# Patient Record
Sex: FEMALE | Race: WHITE
[De-identification: names, ages, dates, MRNs, and addresses within clinical notes are randomized per-mention and may not be internally consistent; named-entity substitution may affect disease eponyms.]

---

## 2018-07-10 ENCOUNTER — HOSPITAL ENCOUNTER (EMERGENCY)
Dept: HOSPITAL 7 - FB.ED | Age: 78
Discharge: HOME | End: 2018-07-10
Payer: MEDICARE

## 2018-07-10 DIAGNOSIS — G20: ICD-10-CM

## 2018-07-10 DIAGNOSIS — K29.50: Primary | ICD-10-CM

## 2018-07-10 DIAGNOSIS — F32.9: ICD-10-CM

## 2018-07-10 DIAGNOSIS — Z79.899: ICD-10-CM

## 2018-07-10 PROCEDURE — 87086 URINE CULTURE/COLONY COUNT: CPT

## 2018-07-10 PROCEDURE — 87186 SC STD MICRODIL/AGAR DIL: CPT

## 2018-07-10 PROCEDURE — 96374 THER/PROPH/DIAG INJ IV PUSH: CPT

## 2018-07-10 PROCEDURE — C9113 INJ PANTOPRAZOLE SODIUM, VIA: HCPCS

## 2018-07-10 PROCEDURE — 85025 COMPLETE CBC W/AUTO DIFF WBC: CPT

## 2018-07-10 PROCEDURE — 80053 COMPREHEN METABOLIC PANEL: CPT

## 2018-07-10 PROCEDURE — 96375 TX/PRO/DX INJ NEW DRUG ADDON: CPT

## 2018-07-10 PROCEDURE — 82150 ASSAY OF AMYLASE: CPT

## 2018-07-10 PROCEDURE — 36415 COLL VENOUS BLD VENIPUNCTURE: CPT

## 2018-07-10 PROCEDURE — 96361 HYDRATE IV INFUSION ADD-ON: CPT

## 2018-07-10 PROCEDURE — 99284 EMERGENCY DEPT VISIT MOD MDM: CPT

## 2018-07-10 PROCEDURE — 81001 URINALYSIS AUTO W/SCOPE: CPT

## 2018-07-10 NOTE — EDM.PDOC
ED HPI GENERAL MEDICAL PROBLEM





- General


Chief Complaint: Abdominal Pain


Stated Complaint: ABDOMINAL PAIN AND NASEOUS


Time Seen by Provider: 07/10/18 19:20


Source of Information: Reports: Patient, Family, Old Records


History Limitations: Reports: No Limitations





- History of Present Illness


INITIAL COMMENTS - FREE TEXT/NARRATIVE: 





Karon comes into [facility] ED with a 7 mos hx of intermittent nausea. There has 

been an associated wt loss of several pounds, and restricted dietary intake. 

There has been no vomiting, constipation or rubi diarrhea. Stools are not pale 

or discolored. Today she reports some retrosternal and epigastric burning pain 

in the abdomen. There is no radiation of pain into the back or pelvis. She 

denies ETOH, NSAID or ASA use. She takes Tylenol for arthritis, and meds for 

Parkinsons. She lives in assisted living, wheelchair bound. She had made an 

appt to see a PCP later this month, but current sxs necessitated an ED visit. 

She was treated for a UTI last month. 


Treatments PTA: Reports: Other (see below)


Other Treatments PTA: Zofran


  ** Epigastric


Pain Score (Numeric/FACES): 4





- Related Data


 Allergies











Allergy/AdvReac Type Severity Reaction Status Date / Time


 


No Known Allergies Allergy   Verified 07/14/15 14:47











Home Meds: 


 Home Meds





ALPRAZolam [Alprazolam] 0.5 mg PO Q8H PRN 07/14/15 [History]


Flaxseed Oil  1 tab PO DAILY 07/14/15 [History]


Multivitamin [Tab A Dale] 1 tab PO DAILY 07/14/15 [History]


Omega-3 Fatty Acids [Fish Oil] 300 mg PO BID 07/14/15 [History]


PARoxetine [Paxil] 20 mg PO DAILY 07/14/15 [History]


Simvastatin [Zocor] 20 mg PO WITHDINNER 07/14/15 [History]


Thyroid,Pork [NP Thyroid] 30 mg PO DAILY 07/14/15 [History]


Acetaminophen/HYDROcodone [Norco 325-7.5 MG] 1 tab PO Q8HR PRN #60 tablet 07/29/

15 [Rx]


Enoxaparin [Lovenox] 40 mg SUBCUT DAILY #7 syringe 07/29/15 [Rx]


Famotidine [Pepcid] 20 mg PO BID #30 tablet 07/29/15 [Rx]


Ferrous Sulfate 325 mg PO DAILY #0 07/29/15 [Rx]


Ondansetron [Zofran ODT] 4 mg PO Q4H PRN #12 tab.dis 07/29/15 [Rx]


Polyethylene Glycol 3350 [MiraLAX] 17 gm PO DAILY #30 packet 07/29/15 [Rx]


rOPINIRole [Requip] 1 mg PO QID #120 tablet 07/29/15 [Rx]


Pantoprazole Sodium [Protonix] 40 mg PO DAILY #14 tablet. 07/10/18 [Rx]











Past Medical History


Other Musculoskeletal History: right hip replacement 12 years ago


Neurological History: Reports: Parkinson's


Other Neuro History: essential tremors


Other Psychiatric History: depression when   





Social & Family History





- Tobacco Use


Smoking Status *Q: Former Smoker


Years of Tobacco use: 5


Used Tobacco, but Quit: Yes


Month/Year Tobacco Last Used: 





- Caffeine Use


Caffeine Use: Reports: Soda





- Recreational Drug Use


Recreational Drug Use: No





ED ROS GENERAL





- Review of Systems


Review Of Systems: See Below


Constitutional: Reports: Malaise, Decreased Appetite, Weight Loss


HEENT: Reports: No Symptoms


Respiratory: Reports: No Symptoms


Cardiovascular: Reports: No Symptoms


Endocrine: Reports: No Symptoms


GI/Abdominal: Reports: Abdominal Pain, Anorexia, Decreased Appetite, Nausea


: Reports: Incontinence


Musculoskeletal: Reports: Joint Pain (L hip chronic)


Skin: Reports: No Symptoms


Neurological: Reports: Pre-Existing Deficit, Tremors


Psychiatric: Reports: Depression


Hematologic/Lymphatic: Reports: No Symptoms


Immunologic: Reports: No Symptoms





ED EXAM, GI/ABD





- Physical Exam


Exam: See Below


Exam Limited By: No Limitations


General Appearance: Alert, WD/WN, No Apparent Distress, Anxious


Eyes: Bilateral: Normal Appearance, EOMI


Ears: Normal External Exam


Nose: Normal Inspection


Throat/Mouth: Normal Inspection, Normal Lips, Normal Teeth, Normal Gums, Normal 

Oropharynx, Normal Voice


Head: Normocephalic


Neck: Normal Inspection, Supple, Non-Tender


Respiratory/Chest: Lungs Clear, Normal Breath Sounds, Chest Non-Tender


Cardiovascular: Normal Peripheral Pulses, Regular Rate, Rhythm, No Edema, No 

Murmur, No Rub


GI/Abdominal Exam: Normal Bowel Sounds, Soft, No Organomegaly, No Distention, 

No Mass, Tender (mild epigastric )


 (Female) Exam: Deferred


Rectal (Female) Exam: Deferred


Back Exam: Normal Inspection


Extremities: Limited Range of Motion (L hip, #3,4,5 digits both hands (

contractures))


Neurological: Alert, Oriented, CN II-XII Intact, Normal Cognition, No Motor/

Sensory Deficits


Psychiatric: Normal Affect (mood neutral)


Skin Exam: Warm, Dry, Intact, Normal Color, No Rash


Lymphatic: No Adenopathy





Course





- Vital Signs


Text/Narrative:: 





Following assessment at the [facility] ED, I started an IV in the LUE, and 

administered 1L of NS and 1L of D5LR over the next 2 hours, Protonix 40 mg IV 

infusion, and Zofran 4 mg IV. Karon had complete resolution of sxs. Screening 

CBC and CMP labs were baseline, UA abnormal, UC set up. 


Last Recorded V/S: 


 Last Vital Signs











Temp  36.4 C   07/10/18 19:00


 


Pulse  80   07/10/18 19:00


 


Resp  18   07/10/18 19:00


 


BP  122/99 H  07/10/18 19:00


 


Pulse Ox  97   07/10/18 19:00














- Orders/Labs/Meds


Orders: 


 Active Orders 24 hr











 Category Date Time Status


 


 CULTURE URINE [RM] Stat Lab  07/10/18 22:26 Ordered


 


 UA W/MICROSCOPIC [URIN] Stat Lab  07/10/18 22:05 Ordered


 


 Dextrose 5%-Lactated Ringers 1,000 ml Med  07/10/18 21:15 Active





 IV ASDIRECTED   


 


 Sodium Chloride 0.9% [Normal Saline] 1,000 ml Med  07/10/18 20:00 Active





 IV ASDIRECTED   


 


 Sodium Chloride 0.9% [Saline Flush] Med  07/10/18 19:37 Active





 10 ml FLUSH ASDIRECTED PRN   


 


 Peripheral IV Insertion Adult [OM.PC] Routine Oth  07/10/18 19:37 Ordered








 Medication Orders





Sodium Chloride (Normal Saline)  1,000 mls @ 999 mls/hr IV ASDIRECTED KATHY


   Last Admin: 07/10/18 20:00  Dose: 999 mls/hr


Dextrose/Lactated Ringer's (Dextrose 5%-Lactated Ringers)  1,000 mls @ 999 mls/

hr IV ASDIRECTED KATHY


Sodium Chloride (Saline Flush)  10 ml FLUSH ASDIRECTED PRN


   PRN Reason: Keep Vein Open








Labs: 


 Laboratory Tests











  07/10/18 07/10/18 07/10/18 Range/Units





  19:45 19:45 19:45 


 


WBC  5.3    (4.5-12.0)  X10-3/uL


 


RBC  4.58    (3.23-5.20)  x10(6)uL


 


Hgb  13.9    (11.5-15.5)  g/dL


 


Hct  41.5  D    (30.0-51.3)  %


 


MCV  90.7    (80-96)  fL


 


MCH  30.4    (27.7-33.6)  pg


 


MCHC  33.6    (32.2-35.4)  g/dL


 


RDW  14.1    (11.5-15.5)  %


 


Plt Count  237    (125-369)  X10(3)uL


 


MPV  9.6    (7.4-10.4)  fL


 


Neut % (Auto)  56.7    (46-82)  %


 


Lymph % (Auto)  33.0    (13-37)  %


 


Mono % (Auto)  8.7    (4-12)  %


 


Eos % (Auto)  1    (1.0-5.0)  %


 


Baso % (Auto)  1    (0-2)  %


 


Neut # (Auto)  3.0    (1.6-8.3)  #


 


Lymph # (Auto)  1.8    (0.6-5.0)  #


 


Mono # (Auto)  0.5    (0.0-1.3)  #


 


Eos # (Auto)  0.0    (0.0-0.8)  #


 


Baso # (Auto)  0.0    (0.0-0.2)  #


 


Sodium   145   (135-145)  mmol/L


 


Potassium   3.2 L   (3.5-5.3)  mmol/L


 


Chloride   106   (100-110)  mmol/L


 


Carbon Dioxide   27   (21-32)  mmol/L


 


BUN   7   (7-18)  mg/dL


 


Creatinine   0.6   (0.55-1.02)  mg/dL


 


Est Cr Clr Drug Dosing   71.78   mL/min


 


Estimated GFR (MDRD)   > 60   (>60)  


 


BUN/Creatinine Ratio   11.7   (9-20)  


 


Glucose   97   ()  mg/dL


 


Calcium   8.4 L   (8.6-10.2)  mg/dL


 


Total Bilirubin   0.4   (0.1-1.3)  mg/dL


 


AST   13   (5-25)  IU/L


 


ALT   8 L   (12-36)  U/L


 


Alkaline Phosphatase   57   ()  IU/L


 


Total Protein   6.8   (6.0-8.0)  g/dL


 


Albumin   3.4   (3.2-4.6)  g/dL


 


Globulin   3.4   g/dL


 


Albumin/Globulin Ratio   1.0   


 


Amylase    38  ()  U/L


 


Urine Color     (YELLOW)  


 


Urine Appearance     (CLEAR)  


 


Urine pH     (5.0-6.5)  


 


Ur Specific Gravity     (1.010-1.025)  


 


Urine Protein     (NEGATIVE)  mg/dL


 


Urine Glucose (UA)     (NEGATIVE)  mg/dL


 


Urine Ketones     (NEGATIVE)  mg/dL


 


Urine Occult Blood     (NEGATIVE)  


 


Urine Nitrite     (NEGATIVE)  


 


Urine Bilirubin     (NEGATIVE)  


 


Urine Urobilinogen     (NEGATIVE)  mg/dL


 


Ur Leukocyte Esterase     (NEGATIVE)  


 


Urine RBC     (0)  


 


Urine WBC     (0)  


 


Ur Squamous Epith Cells     (NS,R,O)  


 


Urine Bacteria     (NS)  














  07/10/18 Range/Units





  22:05 


 


WBC   (4.5-12.0)  X10-3/uL


 


RBC   (3.23-5.20)  x10(6)uL


 


Hgb   (11.5-15.5)  g/dL


 


Hct   (30.0-51.3)  %


 


MCV   (80-96)  fL


 


MCH   (27.7-33.6)  pg


 


MCHC   (32.2-35.4)  g/dL


 


RDW   (11.5-15.5)  %


 


Plt Count   (125-369)  X10(3)uL


 


MPV   (7.4-10.4)  fL


 


Neut % (Auto)   (46-82)  %


 


Lymph % (Auto)   (13-37)  %


 


Mono % (Auto)   (4-12)  %


 


Eos % (Auto)   (1.0-5.0)  %


 


Baso % (Auto)   (0-2)  %


 


Neut # (Auto)   (1.6-8.3)  #


 


Lymph # (Auto)   (0.6-5.0)  #


 


Mono # (Auto)   (0.0-1.3)  #


 


Eos # (Auto)   (0.0-0.8)  #


 


Baso # (Auto)   (0.0-0.2)  #


 


Sodium   (135-145)  mmol/L


 


Potassium   (3.5-5.3)  mmol/L


 


Chloride   (100-110)  mmol/L


 


Carbon Dioxide   (21-32)  mmol/L


 


BUN   (7-18)  mg/dL


 


Creatinine   (0.55-1.02)  mg/dL


 


Est Cr Clr Drug Dosing   mL/min


 


Estimated GFR (MDRD)   (>60)  


 


BUN/Creatinine Ratio   (9-20)  


 


Glucose   ()  mg/dL


 


Calcium   (8.6-10.2)  mg/dL


 


Total Bilirubin   (0.1-1.3)  mg/dL


 


AST   (5-25)  IU/L


 


ALT   (12-36)  U/L


 


Alkaline Phosphatase   ()  IU/L


 


Total Protein   (6.0-8.0)  g/dL


 


Albumin   (3.2-4.6)  g/dL


 


Globulin   g/dL


 


Albumin/Globulin Ratio   


 


Amylase   ()  U/L


 


Urine Color  Yellow  (YELLOW)  


 


Urine Appearance  Slightly cloudy  (CLEAR)  


 


Urine pH  8.0 H  (5.0-6.5)  


 


Ur Specific Gravity  1.015  (1.010-1.025)  


 


Urine Protein  Negative  (NEGATIVE)  mg/dL


 


Urine Glucose (UA)  250 H  (NEGATIVE)  mg/dL


 


Urine Ketones  15 H  (NEGATIVE)  mg/dL


 


Urine Occult Blood  Negative  (NEGATIVE)  


 


Urine Nitrite  Positive H  (NEGATIVE)  


 


Urine Bilirubin  Negative  (NEGATIVE)  


 


Urine Urobilinogen  Normal  (NEGATIVE)  mg/dL


 


Ur Leukocyte Esterase  Large H  (NEGATIVE)  


 


Urine RBC  0-5  (0)  


 


Urine WBC  50-75 H  (0)  


 


Ur Squamous Epith Cells  Few H  (NS,R,O)  


 


Urine Bacteria  Many H  (NS)  











Meds: 


Medications











Generic Name Dose Route Start Last Admin





  Trade Name Freq  PRN Reason Stop Dose Admin


 


Sodium Chloride  1,000 mls @ 999 mls/hr  07/10/18 20:00  07/10/18 20:00





  Normal Saline  IV   999 mls/hr





  ASDIRECTED KATHY   Administration





     





     





     





     


 


Dextrose/Lactated Ringer's  1,000 mls @ 999 mls/hr  07/10/18 21:15  





  Dextrose 5%-Lactated Ringers  IV   





  ASDIRECTED KATHY   





     





     





     





     


 


Sodium Chloride  10 ml  07/10/18 19:37  





  Saline Flush  FLUSH   





  ASDIRECTED PRN   





  Keep Vein Open   





     





     





     














Discontinued Medications














Generic Name Dose Route Start Last Admin





  Trade Name Freq  PRN Reason Stop Dose Admin


 


Ondansetron HCl  4 mg  07/10/18 19:37  07/10/18 20:35





  Zofran  IVPUSH  07/10/18 19:38  4 mg





  ONETIME ONE   Administration





     





     





     





     


 


Pantoprazole Sodium  40 mg  07/10/18 19:37  07/10/18 20:37





  Protonix Iv***  IVPUSH  07/10/18 19:38  40 mg





  ONETIME ONE   Administration





     





     





     





     














Departure





- Departure


Time of Disposition: 22:28


Disposition: Home, Self-Care 01


Condition: Fair


Clinical Impression: 


Gastritis


Qualifiers:


 Gastritis type: unspecified gastritis Chronicity: chronic Gastritis bleeding: 

without bleeding Qualified Code(s): K29.50 - Unspecified chronic gastritis 

without bleeding








- Discharge Information


*PRESCRIPTION DRUG MONITORING PROGRAM REVIEWED*: No


*COPY OF PRESCRIPTION DRUG MONITORING REPORT IN PATIENT HARRY: No


Prescriptions: 


Pantoprazole Sodium [Protonix] 40 mg PO DAILY #14 tablet.dr


Referrals: 


Strand,Duane, MD [Primary Care Provider] - 


Forms:  ED Department Discharge





- Problem List & Annotations


(1) Gastritis


SNOMED Code(s): 5392174


   Code(s): K29.70 - GASTRITIS, UNSPECIFIED, WITHOUT BLEEDING   Status: Acute   

Current Visit: Yes   Annotation/Comment:: I dispensed Protonix 40 mg cap qd, 

and she may continue Zofran ODT prn only. She reportedly uses Hydrocodone at 

home sparingly for chronic L hip pain, monitored by PCP.    


Qualifiers: 


   Gastritis type: unspecified gastritis   Chronicity: chronic   Gastritis 

bleeding: without bleeding   Qualified Code(s): K29.50 - Unspecified chronic 

gastritis without bleeding   





- Problem List Review


Problem List Initiated/Reviewed/Updated: Yes





- My Orders


Last 24 Hours: 


My Active Orders





07/10/18 19:37


Sodium Chloride 0.9% [Saline Flush]   10 ml FLUSH ASDIRECTED PRN 


Peripheral IV Insertion Adult [OM.PC] Routine 





07/10/18 20:00


Sodium Chloride 0.9% [Normal Saline] 1,000 ml IV ASDIRECTED 





07/10/18 21:15


Dextrose 5%-Lactated Ringers 1,000 ml IV ASDIRECTED 





07/10/18 22:05


UA W/MICROSCOPIC [URIN] Stat 





07/10/18 22:26


CULTURE URINE [RM] Stat 














- Assessment/Plan


Last 24 Hours: 


My Active Orders





07/10/18 19:37


Sodium Chloride 0.9% [Saline Flush]   10 ml FLUSH ASDIRECTED PRN 


Peripheral IV Insertion Adult [OM.PC] Routine 





07/10/18 20:00


Sodium Chloride 0.9% [Normal Saline] 1,000 ml IV ASDIRECTED 





07/10/18 21:15


Dextrose 5%-Lactated Ringers 1,000 ml IV ASDIRECTED 





07/10/18 22:05


UA W/MICROSCOPIC [URIN] Stat 





07/10/18 22:26


CULTURE URINE [RM] Stat 











Plan: 





Her UA is abnormal, and a UC is set up. Recent treatment may not have been 

effective. She needs follow up with PCP this week when reports available.

## 2018-07-11 VITALS — DIASTOLIC BLOOD PRESSURE: 56 MMHG | SYSTOLIC BLOOD PRESSURE: 99 MMHG
